# Patient Record
Sex: FEMALE | Race: OTHER | HISPANIC OR LATINO
[De-identification: names, ages, dates, MRNs, and addresses within clinical notes are randomized per-mention and may not be internally consistent; named-entity substitution may affect disease eponyms.]

---

## 2023-02-14 PROBLEM — Z00.00 ENCOUNTER FOR PREVENTIVE HEALTH EXAMINATION: Status: ACTIVE | Noted: 2023-02-14

## 2023-02-15 ENCOUNTER — APPOINTMENT (OUTPATIENT)
Dept: GASTROENTEROLOGY | Facility: CLINIC | Age: 53
End: 2023-02-15
Payer: COMMERCIAL

## 2023-02-15 VITALS
SYSTOLIC BLOOD PRESSURE: 110 MMHG | WEIGHT: 100 LBS | BODY MASS INDEX: 20.99 KG/M2 | HEIGHT: 58 IN | DIASTOLIC BLOOD PRESSURE: 70 MMHG

## 2023-02-15 DIAGNOSIS — Z12.11 ENCOUNTER FOR SCREENING FOR MALIGNANT NEOPLASM OF COLON: ICD-10-CM

## 2023-02-15 PROCEDURE — S0285 CNSLT BEFORE SCREEN COLONOSC: CPT

## 2023-02-15 NOTE — PHYSICAL EXAM
[Alert] : alert [Hearing Threshold Finger Rub Not [place]] : hearing was normal [No Respiratory Distress] : no respiratory distress [None] : no edema [Abdomen Soft] : soft [Abnormal Walk] : normal gait [Normal Color / Pigmentation] : normal skin color and pigmentation [No Focal Deficits] : no focal deficits [Oriented To Time, Place, And Person] : oriented to person, place, and time

## 2023-02-15 NOTE — HISTORY OF PRESENT ILLNESS
[FreeTextEntry1] : BRITT GOODWIN  is being evaluated at the request of Dr. Santiago for an opinion re:  colon cancer screening / chronic heartburn.  Patient denies ever having  having a colonoscopy. Reports  gastritis at EGD > 10 years ago. Admits to chronic heartburn ( increased with  " heavy" foods ). Relieved with acid  suppressant\par \par Denies nausea, vomiting, fever, chills, diarrhea, constipation, melena, hematemesis, \par

## 2023-02-15 NOTE — CONSULT LETTER
[Dear  ___] : Dear  [unfilled], [Consult Letter:] : I had the pleasure of evaluating your patient, [unfilled]. [Please see my note below.] : Please see my note below. [Consult Closing:] : Thank you very much for allowing me to participate in the care of this patient.  If you have any questions, please do not hesitate to contact me. [Sincerely,] : Sincerely, [FreeTextEntry3] : Gordo Eldridge MD\par tel: [phone]\par fax: [phone]\par

## 2023-02-15 NOTE — ASSESSMENT
[FreeTextEntry1] : 1.  GERD: Dietary and lifestyle  modification.  EGD scheduled to exclude Bar[place]ts\par \par 2. Colon cancer screening:  Colonoscopy scheduled\par \par Pertinent available records reviewed\par Risks of the procedures including but not limited to bleeding / perforation / infection / anesthesia complication / missed  lesions explained to the  patient . The patient expressed understanding and a desire to proceed with the procedures.\par \par Risk of not doing procedures includes but is not limited to missed or delayed diagnosis of gastric pathology, colon cancer or other gastrointestinal pathology\par \par A consultation note was provided to the referring provider\par

## 2023-04-06 ENCOUNTER — RESULT REVIEW (OUTPATIENT)
Age: 53
End: 2023-04-06

## 2023-04-07 ENCOUNTER — APPOINTMENT (OUTPATIENT)
Dept: GASTROENTEROLOGY | Facility: HOSPITAL | Age: 53
End: 2023-04-07

## 2023-05-10 ENCOUNTER — APPOINTMENT (OUTPATIENT)
Dept: GASTROENTEROLOGY | Facility: CLINIC | Age: 53
End: 2023-05-10
Payer: COMMERCIAL

## 2023-05-10 VITALS
BODY MASS INDEX: 20.78 KG/M2 | SYSTOLIC BLOOD PRESSURE: 118 MMHG | HEIGHT: 58 IN | HEART RATE: 68 BPM | WEIGHT: 99 LBS | OXYGEN SATURATION: 98 % | DIASTOLIC BLOOD PRESSURE: 70 MMHG

## 2023-05-10 DIAGNOSIS — Z78.9 OTHER SPECIFIED HEALTH STATUS: ICD-10-CM

## 2023-05-10 PROCEDURE — 99214 OFFICE O/P EST MOD 30 MIN: CPT

## 2023-05-10 RX ORDER — CLARITHROMYCIN 500 MG/1
500 TABLET, FILM COATED ORAL
Qty: 28 | Refills: 0 | Status: ACTIVE | COMMUNITY
Start: 2023-05-10 | End: 1900-01-01

## 2023-05-10 RX ORDER — AMOXICILLIN 500 MG/1
500 CAPSULE ORAL TWICE DAILY
Qty: 28 | Refills: 0 | Status: ACTIVE | COMMUNITY
Start: 2023-05-10 | End: 1900-01-01

## 2023-05-10 NOTE — PHYSICAL EXAM
[Alert] : alert [Sclera] : the sclera and conjunctiva were normal [Hearing Threshold Finger Rub Not [place]] : hearing was normal [Normal Appearance] : the appearance of the neck was normal [No Respiratory Distress] : no respiratory distress [None] : no edema [Abdomen Soft] : soft [Normal Color / Pigmentation] : normal skin color and pigmentation [No Focal Deficits] : no focal deficits [Oriented To Time, Place, And Person] : oriented to person, place, and time

## 2023-05-10 NOTE — HISTORY OF PRESENT ILLNESS
[FreeTextEntry1] : Present for follow up after recent EGD / colonoscopy.\par \par - EGD / colonoscopy ( 4/7/2023)  : h. pylori gastris / hemorrhoids / polyp / diverticulosis.  Omeprazole prescribed pending office  follow up \par \par Denies  nausea, vomiting, fever, chills, diarrhea, constipation, melena, hematemesis

## 2023-05-10 NOTE — ASSESSMENT
[FreeTextEntry1] : 1. H. pylori gastritis:  Amox / Biaxin /. PPI BID  x 14 days\par \par 2. GERD:  OMeprazole daily for 3  months after eradication of  H. pylori\par \par Pertinent available records reviewed\par

## 2023-05-17 RX ORDER — OMEPRAZOLE 20 MG/1
20 CAPSULE, DELAYED RELEASE ORAL TWICE DAILY
Qty: 28 | Refills: 1 | Status: ACTIVE | COMMUNITY
Start: 2023-05-17 | End: 1900-01-01

## 2023-07-13 NOTE — PHYSICAL EXAM
[Alert] : alert [Sclera] : the sclera and conjunctiva were normal [Hearing Threshold Finger Rub Not [place]] : hearing was normal [Normal Appearance] : the appearance of the neck was normal [No Respiratory Distress] : no respiratory distress [None] : no edema [Abdomen Soft] : soft [Normal Color / Pigmentation] : normal skin color and pigmentation [No Focal Deficits] : no focal deficits [Oriented To Time, Place, And Person] : oriented to person, place, and time

## 2023-07-14 ENCOUNTER — APPOINTMENT (OUTPATIENT)
Dept: GASTROENTEROLOGY | Facility: CLINIC | Age: 53
End: 2023-07-14
Payer: COMMERCIAL

## 2023-07-14 VITALS
WEIGHT: 99 LBS | SYSTOLIC BLOOD PRESSURE: 143 MMHG | DIASTOLIC BLOOD PRESSURE: 80 MMHG | BODY MASS INDEX: 20.78 KG/M2 | HEIGHT: 58 IN

## 2023-07-14 DIAGNOSIS — K21.9 GASTRO-ESOPHAGEAL REFLUX DISEASE W/OUT ESOPHAGITIS: ICD-10-CM

## 2023-07-14 DIAGNOSIS — A04.8 OTHER SPECIFIED BACTERIAL INTESTINAL INFECTIONS: ICD-10-CM

## 2023-07-14 PROCEDURE — 99214 OFFICE O/P EST MOD 30 MIN: CPT

## 2023-07-14 RX ORDER — PANTOPRAZOLE 40 MG/1
40 TABLET, DELAYED RELEASE ORAL
Qty: 30 | Refills: 3 | Status: ACTIVE | COMMUNITY
Start: 2023-07-14 | End: 1900-01-01

## 2023-07-14 NOTE — ASSESSMENT
[FreeTextEntry1] : 1. H/O  H. pylori gastritis: treated ...sxs persist. Will obtain breath test to document erradication\par \par 2. GERD:  Trial Pantoprazole as Omeprazole seems not to be working\par \par \par \par Pertinent available records reviewed\par

## 2023-07-14 NOTE — HISTORY OF PRESENT ILLNESS
[FreeTextEntry1] : Presents with persistent heartburn  that occurs 4-5 hours after omeprazole. Has decreased coffee. Does not smoke. Last seen in May 2023 in f/u after recent EGD / colonoscopy.\par \par - EGD / colonoscopy ( 4/7/2023)  : h. pylori gastris / hemorrhoids / polyp / diverticulosis.  \par H. pylori treated\par \par Denies  nausea, vomiting, fever, chills, diarrhea, constipation, melena, hematemesis

## 2023-07-28 ENCOUNTER — APPOINTMENT (OUTPATIENT)
Dept: GASTROENTEROLOGY | Facility: CLINIC | Age: 53
End: 2023-07-28

## 2023-07-28 ENCOUNTER — LABORATORY RESULT (OUTPATIENT)
Age: 53
End: 2023-07-28

## 2023-09-18 ENCOUNTER — APPOINTMENT (OUTPATIENT)
Dept: GASTROENTEROLOGY | Facility: CLINIC | Age: 53
End: 2023-09-18